# Patient Record
Sex: FEMALE | Race: WHITE | URBAN - METROPOLITAN AREA
[De-identification: names, ages, dates, MRNs, and addresses within clinical notes are randomized per-mention and may not be internally consistent; named-entity substitution may affect disease eponyms.]

---

## 2018-01-16 ENCOUNTER — HOSPITAL ENCOUNTER (INPATIENT)
Facility: CLINIC | Age: 83
LOS: 2 days | Discharge: HOME | DRG: 392 | End: 2018-01-18
Attending: INTERNAL MEDICINE | Admitting: INTERNAL MEDICINE
Payer: COMMERCIAL

## 2018-01-16 DIAGNOSIS — R53.81 PHYSICAL DECONDITIONING: Primary | ICD-10-CM

## 2018-01-16 PROBLEM — E87.0 HYPERNATREMIA: Status: ACTIVE | Noted: 2018-01-16

## 2018-01-16 PROBLEM — E87.6 HYPOKALEMIA: Status: ACTIVE | Noted: 2018-01-16

## 2018-01-16 PROBLEM — E87.1 HYPONATREMIA: Status: ACTIVE | Noted: 2018-01-16

## 2018-01-16 LAB
ANION GAP SERPL CALCULATED.3IONS-SCNC: 12 MMOL/L (ref 3–14)
BUN SERPL-MCNC: 51 MG/DL (ref 7–30)
CALCIUM SERPL-MCNC: 7.5 MG/DL (ref 8.5–10.1)
CHLORIDE SERPL-SCNC: 87 MMOL/L (ref 94–109)
CO2 SERPL-SCNC: 22 MMOL/L (ref 20–32)
CREAT SERPL-MCNC: 1.05 MG/DL (ref 0.52–1.04)
GFR SERPL CREATININE-BSD FRML MDRD: 49 ML/MIN/1.7M2
GLUCOSE SERPL-MCNC: 75 MG/DL (ref 70–99)
MAGNESIUM SERPL-MCNC: 2.9 MG/DL (ref 1.6–2.3)
POTASSIUM SERPL-SCNC: 3 MMOL/L (ref 3.4–5.3)
SODIUM SERPL-SCNC: 121 MMOL/L (ref 133–144)

## 2018-01-16 PROCEDURE — 25000128 H RX IP 250 OP 636: Performed by: INTERNAL MEDICINE

## 2018-01-16 PROCEDURE — 12000000 ZZH R&B MED SURG/OB

## 2018-01-16 PROCEDURE — 99207 ZZC CDG-MDM COMPONENT: MEETS MODERATE - UP CODED: CPT | Performed by: INTERNAL MEDICINE

## 2018-01-16 PROCEDURE — 99223 1ST HOSP IP/OBS HIGH 75: CPT | Mod: AI | Performed by: INTERNAL MEDICINE

## 2018-01-16 PROCEDURE — 83735 ASSAY OF MAGNESIUM: CPT | Performed by: INTERNAL MEDICINE

## 2018-01-16 PROCEDURE — 25000132 ZZH RX MED GY IP 250 OP 250 PS 637: Performed by: INTERNAL MEDICINE

## 2018-01-16 PROCEDURE — 36415 COLL VENOUS BLD VENIPUNCTURE: CPT | Performed by: INTERNAL MEDICINE

## 2018-01-16 PROCEDURE — 80048 BASIC METABOLIC PNL TOTAL CA: CPT | Performed by: INTERNAL MEDICINE

## 2018-01-16 RX ORDER — ONDANSETRON 2 MG/ML
4 INJECTION INTRAMUSCULAR; INTRAVENOUS EVERY 6 HOURS PRN
Status: DISCONTINUED | OUTPATIENT
Start: 2018-01-16 | End: 2018-01-18 | Stop reason: HOSPADM

## 2018-01-16 RX ORDER — SODIUM CHLORIDE AND POTASSIUM CHLORIDE 150; 900 MG/100ML; MG/100ML
INJECTION, SOLUTION INTRAVENOUS CONTINUOUS
Status: DISPENSED | OUTPATIENT
Start: 2018-01-16 | End: 2018-01-18

## 2018-01-16 RX ORDER — AMOXICILLIN 250 MG
2 CAPSULE ORAL 2 TIMES DAILY PRN
Status: DISCONTINUED | OUTPATIENT
Start: 2018-01-16 | End: 2018-01-18 | Stop reason: HOSPADM

## 2018-01-16 RX ORDER — POTASSIUM CHLORIDE 1500 MG/1
20-40 TABLET, EXTENDED RELEASE ORAL
Status: DISCONTINUED | OUTPATIENT
Start: 2018-01-16 | End: 2018-01-18 | Stop reason: HOSPADM

## 2018-01-16 RX ORDER — PROCHLORPERAZINE MALEATE 5 MG
5 TABLET ORAL EVERY 6 HOURS PRN
Status: DISCONTINUED | OUTPATIENT
Start: 2018-01-16 | End: 2018-01-18 | Stop reason: HOSPADM

## 2018-01-16 RX ORDER — ACETAMINOPHEN 325 MG/1
650 TABLET ORAL EVERY 4 HOURS PRN
Status: DISCONTINUED | OUTPATIENT
Start: 2018-01-16 | End: 2018-01-18 | Stop reason: HOSPADM

## 2018-01-16 RX ORDER — POTASSIUM CHLORIDE 7.45 MG/ML
10 INJECTION INTRAVENOUS
Status: DISCONTINUED | OUTPATIENT
Start: 2018-01-16 | End: 2018-01-18 | Stop reason: HOSPADM

## 2018-01-16 RX ORDER — ONDANSETRON 4 MG/1
4 TABLET, ORALLY DISINTEGRATING ORAL EVERY 6 HOURS PRN
Status: DISCONTINUED | OUTPATIENT
Start: 2018-01-16 | End: 2018-01-18 | Stop reason: HOSPADM

## 2018-01-16 RX ORDER — POTASSIUM CHLORIDE 29.8 MG/ML
20 INJECTION INTRAVENOUS
Status: DISCONTINUED | OUTPATIENT
Start: 2018-01-16 | End: 2018-01-18 | Stop reason: HOSPADM

## 2018-01-16 RX ORDER — POTASSIUM CL/LIDO/0.9 % NACL 10MEQ/0.1L
10 INTRAVENOUS SOLUTION, PIGGYBACK (ML) INTRAVENOUS
Status: DISCONTINUED | OUTPATIENT
Start: 2018-01-16 | End: 2018-01-18 | Stop reason: HOSPADM

## 2018-01-16 RX ORDER — AMOXICILLIN 250 MG
1 CAPSULE ORAL 2 TIMES DAILY PRN
Status: DISCONTINUED | OUTPATIENT
Start: 2018-01-16 | End: 2018-01-18 | Stop reason: HOSPADM

## 2018-01-16 RX ORDER — POTASSIUM CHLORIDE 1.5 G/1.58G
20-40 POWDER, FOR SOLUTION ORAL
Status: DISCONTINUED | OUTPATIENT
Start: 2018-01-16 | End: 2018-01-18 | Stop reason: HOSPADM

## 2018-01-16 RX ORDER — PROCHLORPERAZINE 25 MG
12.5 SUPPOSITORY, RECTAL RECTAL EVERY 12 HOURS PRN
Status: DISCONTINUED | OUTPATIENT
Start: 2018-01-16 | End: 2018-01-18 | Stop reason: HOSPADM

## 2018-01-16 RX ORDER — HYDROCODONE BITARTRATE AND ACETAMINOPHEN 5; 325 MG/1; MG/1
1 TABLET ORAL EVERY 6 HOURS PRN
Status: DISCONTINUED | OUTPATIENT
Start: 2018-01-16 | End: 2018-01-16

## 2018-01-16 RX ORDER — MAGNESIUM SULFATE HEPTAHYDRATE 40 MG/ML
4 INJECTION, SOLUTION INTRAVENOUS EVERY 4 HOURS PRN
Status: DISCONTINUED | OUTPATIENT
Start: 2018-01-16 | End: 2018-01-18 | Stop reason: HOSPADM

## 2018-01-16 RX ORDER — BISACODYL 10 MG
10 SUPPOSITORY, RECTAL RECTAL DAILY PRN
Status: DISCONTINUED | OUTPATIENT
Start: 2018-01-16 | End: 2018-01-18 | Stop reason: HOSPADM

## 2018-01-16 RX ORDER — NALOXONE HYDROCHLORIDE 0.4 MG/ML
.1-.4 INJECTION, SOLUTION INTRAMUSCULAR; INTRAVENOUS; SUBCUTANEOUS
Status: DISCONTINUED | OUTPATIENT
Start: 2018-01-16 | End: 2018-01-18 | Stop reason: HOSPADM

## 2018-01-16 RX ADMIN — POTASSIUM CHLORIDE 40 MEQ: 1.5 POWDER, FOR SOLUTION ORAL at 22:39

## 2018-01-16 RX ADMIN — POTASSIUM CHLORIDE AND SODIUM CHLORIDE: 900; 150 INJECTION, SOLUTION INTRAVENOUS at 22:22

## 2018-01-16 NOTE — IP AVS SNAPSHOT
[place] [address] [facility]    [address]    [facility] [address]    Phone:  [phone]    Fax:  [phone]                                       After Visit Summary   1/16/2018    Dorothy E Severson    MRN: [id number]           After Visit Summary Signature Page     I have received my discharge instructions, and my questions have been answered. I have discussed any challenges I see with this plan with the nurse or doctor.    ..........................................................................................................................................  Patient/Patient Representative Signature      ..........................................................................................................................................  Patient Representative Print Name and Relationship to Patient    ..................................................               ................................................  Date                                            Time    ..........................................................................................................................................  Reviewed by Signature/Title    ...................................................              ..............................................  Date                                                            Time

## 2018-01-16 NOTE — IP AVS SNAPSHOT
MRN:[id number]                      After Visit Summary   1/16/2018    Dorothy E Severson    MRN: [id number]           Thank you!     Thank you for choosing [facility] for your care. Our goal is always to provide you with excellent care. Hearing back from our patients is one way we can continue to improve our services. Please take a few minutes to complete the written survey that you may receive in the mail after you visit. If you would like to speak to someone directly about your visit please contact Patient Relations at [phone]. Thank you!          Patient Information     Date Of Birth          12/7/1928        Designated Caregiver       Most Recent Value    Caregiver    Will someone help with your care after discharge? yes    Name of designated caregiver Paul Severson    Phone number of caregiver [phone]    Caregiver address [address] [place], [place] [id number]      About your hospital stay     You were admitted on:  January 16, 2018 You last received care in the:  [place] [address] [facility]    You were discharged on:  January 18, 2018       Who to Call     For medical emergencies, please call 911.  For non-urgent questions about your medical care, please call your primary care provider or clinic, [phone]          Attending Provider     Provider Specialty    Dominic Good MD Internal Medicine       Primary Care Provider Office Phone # Fax #    [facility] [phone]      After Care Instructions     Activity       Your activity upon discharge: activity as tolerated            Diet       Follow this diet upon discharge: regular                  Follow-up Appointments     Follow-up and recommended labs and tests        See primary MD as needed                  Pending Results     No orders found from 1/14/2018 to 1/17/2018.            Statement of Approval     Ordered          01/18/18 1358  I have reviewed and agree with all the  "recommendations and orders detailed in this document.  EFFECTIVE NOW     Approved and electronically signed by:  Marco Bolaños MD             Admission Information     Date & Time Provider Department Dept. Phone    2018 Dominic Good MD [place] [address] [facility] [phone]      Your Vitals Were     Blood Pressure Pulse Temperature Respirations Height Weight    134/53 (BP Location: Left arm) 64 97.1  F (36.2  C) (Oral) 18 1.499 m (4' 11.02\") 36.3 kg (80 lb)    Pulse Oximetry BMI (Body Mass Index)                99% 16.15 kg/m2          MyChart Information     [facility] lets you send messages to your doctor, view your test results, renew your prescriptions, schedule appointments and more. To sign up, go to [email or web address] . Click on \"Log in\" on the left side of the screen, which will take you to the Welcome page. Then click on \"Sign up Now\" on the right side of the page.     You will be asked to enter the access code listed below, as well as some personal information. Please follow the directions to create your username and password.     Your access code is: [email or web address]  Expires: 2018  2:25 PM     Your access code will  in 90 days. If you need help or a new code, please call your [facility] clinic or [phone].        Care EveryWhere ID     This is your Care EveryWhere ID. This could be used by other organizations to access your [facility] medical records  [id number]        Equal Access to Services     [facility] AH: Hadii aad ku hadasho Soomaali, waaxda luqadaha, qaybta kaalmada adeegyada, jacinto hale . So [facility] [phone].    ATENCIÓN: Si habla español, tiene a van disposición servicios gratuitos de asistencia lingüística. Llame al [phone].    We comply with applicable federal civil rights laws and [place] laws. We do not discriminate on the basis of race, color, national origin, age, disability, sex, sexual orientation, or gender " identity.               Review of your medicines      START taking        Dose / Directions    order for DME   Used for:  Physical deconditioning        Equipment being ordered: Walker Wheels () and Youth Walker () Treatment Diagnosis: Impaired gait stability   Quantity:  1 each   Refills:  0            Where to get your medicines      Some of these will need a paper prescription and others can be bought over the counter. Ask your nurse if you have questions.     Bring a paper prescription for each of these medications     order for DME                Protect others around you: Learn how to safely use, store and throw away your medicines at www.disposemymeds.org.             Medication List: This is a list of all your medications and when to take them. Check marks below indicate your daily home schedule. Keep this list as a reference.      Medications           Morning Afternoon Evening Bedtime As Needed    order for DME   Equipment being ordered: Walker Wheels () and Youth Walker () Treatment Diagnosis: Impaired gait stability

## 2018-01-17 ENCOUNTER — APPOINTMENT (OUTPATIENT)
Dept: PHYSICAL THERAPY | Facility: CLINIC | Age: 83
DRG: 392 | End: 2018-01-17
Attending: INTERNAL MEDICINE
Payer: COMMERCIAL

## 2018-01-17 LAB
ANION GAP SERPL CALCULATED.3IONS-SCNC: 7 MMOL/L (ref 3–14)
BUN SERPL-MCNC: 46 MG/DL (ref 7–30)
C DIFF TOX B STL QL: NEGATIVE
CALCIUM SERPL-MCNC: 7.5 MG/DL (ref 8.5–10.1)
CHLORIDE SERPL-SCNC: 99 MMOL/L (ref 94–109)
CO2 SERPL-SCNC: 20 MMOL/L (ref 20–32)
CREAT SERPL-MCNC: 0.96 MG/DL (ref 0.52–1.04)
GFR SERPL CREATININE-BSD FRML MDRD: 54 ML/MIN/1.7M2
GLUCOSE SERPL-MCNC: 70 MG/DL (ref 70–99)
POTASSIUM SERPL-SCNC: 4 MMOL/L (ref 3.4–5.3)
POTASSIUM SERPL-SCNC: 4 MMOL/L (ref 3.4–5.3)
SODIUM SERPL-SCNC: 126 MMOL/L (ref 133–144)
SPECIMEN SOURCE: NORMAL
TSH SERPL DL<=0.005 MIU/L-ACNC: 0.68 MU/L (ref 0.4–4)

## 2018-01-17 PROCEDURE — 99232 SBSQ HOSP IP/OBS MODERATE 35: CPT | Performed by: INTERNAL MEDICINE

## 2018-01-17 PROCEDURE — 40000914 ZZH STATISTIC SITTER, DAY HOURS

## 2018-01-17 PROCEDURE — 84443 ASSAY THYROID STIM HORMONE: CPT | Performed by: INTERNAL MEDICINE

## 2018-01-17 PROCEDURE — 36415 COLL VENOUS BLD VENIPUNCTURE: CPT | Performed by: INTERNAL MEDICINE

## 2018-01-17 PROCEDURE — 87493 C DIFF AMPLIFIED PROBE: CPT | Performed by: INTERNAL MEDICINE

## 2018-01-17 PROCEDURE — 12000007 ZZH R&B INTERMEDIATE

## 2018-01-17 PROCEDURE — 99207 ZZC CDG-MDM COMPONENT: MEETS LOW - DOWN CODED: CPT | Performed by: INTERNAL MEDICINE

## 2018-01-17 PROCEDURE — 40000916 ZZH STATISTIC SITTER, NIGHT HOURS

## 2018-01-17 PROCEDURE — 25000132 ZZH RX MED GY IP 250 OP 250 PS 637: Performed by: INTERNAL MEDICINE

## 2018-01-17 PROCEDURE — 97161 PT EVAL LOW COMPLEX 20 MIN: CPT | Mod: GP | Performed by: PHYSICAL THERAPIST

## 2018-01-17 PROCEDURE — 40000915 ZZH STATISTIC SITTER, EVENING HOURS

## 2018-01-17 PROCEDURE — 80048 BASIC METABOLIC PNL TOTAL CA: CPT | Performed by: INTERNAL MEDICINE

## 2018-01-17 PROCEDURE — 84132 ASSAY OF SERUM POTASSIUM: CPT | Performed by: INTERNAL MEDICINE

## 2018-01-17 PROCEDURE — 25000128 H RX IP 250 OP 636: Performed by: INTERNAL MEDICINE

## 2018-01-17 PROCEDURE — 97530 THERAPEUTIC ACTIVITIES: CPT | Mod: GP | Performed by: PHYSICAL THERAPIST

## 2018-01-17 PROCEDURE — 97116 GAIT TRAINING THERAPY: CPT | Mod: GP | Performed by: PHYSICAL THERAPIST

## 2018-01-17 PROCEDURE — 40000193 ZZH STATISTIC PT WARD VISIT: Performed by: PHYSICAL THERAPIST

## 2018-01-17 RX ORDER — MULTIPLE VITAMINS W/ MINERALS TAB 9MG-400MCG
1 TAB ORAL DAILY
Status: DISCONTINUED | OUTPATIENT
Start: 2018-01-18 | End: 2018-01-18 | Stop reason: HOSPADM

## 2018-01-17 RX ADMIN — ACETAMINOPHEN 650 MG: 325 TABLET, FILM COATED ORAL at 20:01

## 2018-01-17 RX ADMIN — POTASSIUM CHLORIDE AND SODIUM CHLORIDE: 900; 150 INJECTION, SOLUTION INTRAVENOUS at 20:31

## 2018-01-17 RX ADMIN — POTASSIUM CHLORIDE 20 MEQ: 1.5 POWDER, FOR SOLUTION ORAL at 00:29

## 2018-01-17 ASSESSMENT — ACTIVITIES OF DAILY LIVING (ADL)
ADLS_ACUITY_SCORE: 16

## 2018-01-17 NOTE — PLAN OF CARE
Problem: Patient Care Overview  Goal: Plan of Care/Patient Progress Review  Orientation: A/O to self only, triggered bed alarm/VPM continuously at start of shift and sitter initiated with effectiveness. Continues to attempt to exit bed and repeated questions and obvious confusion.   VS stable, afebrile, denies pain   Tele: SR - 60s   LS: dim  GI: + Flatus + BM (incontinent-large). Denies N/V. BS active  : Adequate urine output-frequency noted and continent this shift  Diet: Clear Liquid  PIV: NS and 20mEq of K+ decreased this shift to 40ml/hr. Potassium replaced erlin/noc with recheck 4.0  Activity: Assist of 1 with gait. Pt slept comfortably throughout shift.   Disposition: Expected discharge TBD

## 2018-01-17 NOTE — PROGRESS NOTES
Infection Prevention:    Patient placed on Enteric precautions because of diarrhea with possible infectious etiology. Please contact Infection Prevention with any questions/concerns at *[phone].    Kellie Baez, ICP

## 2018-01-17 NOTE — PLAN OF CARE
Problem: Patient Care Overview  Goal: Plan of Care/Patient Progress Review  VSS. Denied pain. Tele: SR. Enteric D/C-Cdiff negative. Oriented to self only. Up w/ SBA, walked in halls. Continent of urine. Tolerating regular diet. Coccyx red,blanchable, new mepilex applied. Skin tear to L arm TALAT,Scabbed. Son in Law w/ patient second half of shift, sitter currently discontinued. 2 loose bowel movements. K:4.0, .

## 2018-01-17 NOTE — PHARMACY-ADMISSION MEDICATION HISTORY
Attempted med rec with family.  Talked with daughter in [place]- she said son Chilango lives with her would be the one to know what meds was getting.  She also said she thought it was mostly supplements.    I did get a hold of Chilango.  He said she is only on vitamin /supplements (had some older entries in epic for lisinopril and other meds).  I clarified specifically that she was not on those prescription meds.  When I asked for details on supplements taking he said since they weren't probably going to be given in the hospital he didn't see why we needed to list them out.  He had talked with [facility] last evening and discussed these same meds.   There is a list of supplements in the Care Everywhere record that I think may be that information.    Paged MD to let him know I was taking everything off our list but there were some supplements and if he thought she needed something he could order.

## 2018-01-17 NOTE — CONSULTS
"CLINICAL NUTRITION SERVICES  -  ASSESSMENT NOTE      Malnutrition: Non-severe or greater        REASON FOR ASSESSMENT  Dorothy E Severson is a 89 year old female seen by Registered Dietitian for Admission Nutrition Risk Screen - Unintentional weight loss of 10# or more in past 2 months and Nutrition Jose <3.      NUTRITION HISTORY  - Information obtained from patient's son in room.  Limited as patient resides with a different son and family reports she has minimal nutrition insight.  - Patient with a h/o dementia.  - Overall acute illness with N/V and diarrhea x 6 days PTA.  Admitted with likely viral gastroenteritis.    - Resides with son (Chilango) who prepares all meals and does all grocery shopping.  - Son currently in room does confirm a decrease in overall appetite during the past >1 year and feels his mom has had a decline in the amount of food she eats.  Denies long-term N/V or diarrhea or chewing/swallowing difficulty.  He does not believe she takes any oral supplements at home.  He confirms that she has had weight loss during this time (see below).   - NKFA.      CURRENT NUTRITION ORDERS  Diet Order:     Regular    Current Intake/Tolerance:  Eating small amounts of hamburger while in room.        PHYSICAL FINDINGS  Observed  With overt fat/muscle loss, see below, did not observe LEs  Obtained from Chart/Interdisciplinary Team  Confused/oriented to self  BM 1/17    ANTHROPOMETRICS  Height: 4' 11\"  Weight: 36.4 kg (80#)  Body mass index is 16.15 kg/(m^2).  Weight Status:  Underweight BMI <18.5  IBW: 44.5 kg (98#)  % IBW: 82%  Weight History:  Wt Readings from Last 10 Encounters:   01/16/18 36.3 kg (80 lb)   01/03/13 47.6 kg (105 lb)   08/28/12 49.9 kg (110 lb)   08/21/12 49.4 kg (109 lb)   07/16/12 50.3 kg (111 lb)   - Minimal recent wt trending available.  Per care everywhere office visit 8/24/2016 = 101#.  This indicates that wt has trended down over the past >1 year (by at least 21%), though the timeframe " is unclear at this time.  Son in room confirms wt loss but not sure of exact wt lost or timeframe.    LABS  Labs reviewed    MEDICATIONS  Medications reviewed    Dosing Weight 36.4 kg - admit wt    ASSESSED NUTRITION NEEDS PER APPROVED PRACTICE GUIDELINES:  Estimated Energy Needs: 4342-4330+ kcals (30-35 Kcal/Kg+)  Justification: repletion  Estimated Protein Needs: 44-55+ grams protein (1.2-1.5 g pro/Kg+)  Justification: Repletion  Estimated Fluid Needs: 1399-3851 mL (1 mL/Kcal)  Justification: maintenance and per provider pending fluid status    MALNUTRITION:  % Weight Loss:  Unable to determine  % Intake:  <75% for >/= 3 months (non-severe malnutrition)  Subcutaneous Fat Loss:  Orbital region mild depletion and Upper arm region mild depletion  Muscle Loss:  Temporal region moderate depletion, Acromion bone region moderatge depletion and Dorsal hand region moderate depletion  Fluid Retention:  None noted    Malnutrition Diagnosis: Non-Severe malnutrition (or greater)  In Context of:  Chronic illness or disease on  Environmental or social circumstances    NUTRITION DIAGNOSIS:  Inadequate oral intake related to decreased appetite, acute illness, and likely progressive/chronic disease (dementia) as evidenced by likely meeting <75% needs x >3 months, 21% wt loss with unclear timeframe, at least mild to moderate fat/muscle loss with coding of non-severe malnutrition or greater.        NUTRITION INTERVENTIONS  Recommendations / Nutrition Prescription  Change to high kcal/high protein diet.    Ensure shake BID between meals.  Son agreeable.      Daily MVI/M.      Implementation  Nutrition education: Provided education on addition of supplement with son in room.  Discussed offerings of meals TID with use of supplement between meals as able.    Medical Food Supplement, Multivitamin/Mineral: As above.    Collaboration and Referral of Nutrition care: Discussed POC with team during rounds.      Nutrition Goals  Patient to  consume at least 50% of meals TID + 1-2 supplements daily.       MONITORING AND EVALUATION:  Progress towards goals will be monitored and evaluated per protocol and Practice Guidelines        Brooke Jewell RD, LD  Clinical Dietitian  3rd floor/ICU: [phone]  All other floors: [phone]  Weekend/holiday: [phone]

## 2018-01-17 NOTE — H&P
[facility]  History and Physical   Hospitalist Service    Dominic Good MD    Dorothy E Severson MRN# [id number]   YOB: 1928 Age: 89 year old      Date of Admission:  1/16/2018           Assessment and Plan:   Dorothy E. Severson is an 89-year-old female with history of hypertension, GERD, sciatica, dementia, osteoarthritis, osteoporosis, cataract surgery, and rotator cuff surgery.  She presented to [place] [facility] emergency department for evaluation of weakness, nausea, vomiting, diarrhea, dizziness, and falls. She had felt ill for 6 days. Her son with whom she lives has also been ill for 3 or 4 days.  She had fallen 2 days prior but not on the day of presentation.  She sustained no injury.  Her main complaint seemed to be ongoing diarrhea.  Nausea and vomiting had stopped a day or so before. Vital signs showed elevated blood pressure but they were otherwise stable.  Labs showed hyponatremia with sodium 116, hypokalemia with potassium of 2.7, metabolic acidosis with bicarb of 17, and acute kidney insufficiency with BUN of 52 and creatinine of 1.23.  CBC was unremarkable.  Urinalysis, liver function tests, and influenza testing were negative.  She was having some back pain so lumbar spine x-ray was obtained and showed no acute process.  There were no beds at [facility] so I was asked to admit Shantell directly with hyponatremia and hyperkalemia in the setting of likely viral gastroenteritis.    Problem list:    1.  Likely viral gastroenteritis.  Supportive cares.  Clear liquid diet for now.    2.  Hyponatremia.  Likely due to GI sodium losses from vomiting and diarrhea with free water replacement.  I suspect that this is acute.  Continue normal saline but at 40 cc/h she is already corrected from 116 to 121.  Clear liquid diet will be ordered for now.    3.  Hypokalemia.  Likely due to GI losses with free water replacement.  Replace per protocol.    4.  Physical deconditioning.   Treat underlying issues discussed above.  Physical therapy will be consulted.    DNR/DNI.  Confirmed with the patient's family  Mechanical DVT prophylaxis  Disposition: Admit as inpatient           Code Status:   DNR / DNI         Primary Care Physician:   [facility] [phone]         Chief Complaint:   Diarrhea, hyponatremia    History is obtained from Shantell, ED provider at [facility]         History of Present Illness:   Dorothy E. Severson is an 89-year-old female with history of hypertension, GERD, sciatica, dementia, osteoarthritis, osteoporosis, cataract surgery, and rotator cuff surgery.  She presented to [facility] emergency department for evaluation of weakness, nausea, vomiting, diarrhea, dizziness, and falls. She had felt ill for 6 days. Her son with whom she lives has also been ill for 3 or 4 days.  She had fallen 2 days prior but not on the day of presentation.  She sustained no injury.  Her main complaint seemed to be ongoing diarrhea.  Nausea and vomiting had stopped a day or so before. Vital signs showed elevated blood pressure but they were otherwise stable.  Labs showed hyponatremia with sodium 116, hypokalemia with potassium of 2.7, metabolic acidosis with bicarb of 17, and acute kidney insufficiency with BUN of 52 and creatinine of 1.23.  CBC was unremarkable.  Urinalysis, liver function tests, and influenza testing were negative.  She was having some back pain so lumbar spine x-ray was obtained and showed no acute process.  There were no beds at [facility] so I was asked to admit Shantell directly with hyponatremia and hyperkalemia in the setting of likely viral gastroenteritis.           Past Medical History:     Patient Active Problem List   Diagnosis     Health Care Home     DDD (degenerative disc disease), lumbar     Hypernatremia     Hypokalemia     Hyponatremia      Past Medical History:   Diagnosis Date     Gastro-oesophageal reflux disease      Hypertension        "       Past Surgical History:     Past Surgical History:   Procedure Laterality Date     COLONOSCOPY  7/16/2012    Procedure: COLONOSCOPY;  COLONOSCOPY;  Surgeon: Eduin Pearson MD;  Location:  GI     EYE SURGERY      cataract     ORTHOPEDIC SURGERY      arthroscopy for rotatotr cuff tear            Home Medications:     Prior to Admission medications    Medication Sig Last Dose Taking? Auth Provider       Nicola Gautam MD Steinman, Randall Ira, MD       Trigger, Jayce Horton MD       Unknown, Entered By History   calcium carbonate (TUMS) 500 MG chewable tablet Take 1 chew tab by mouth daily.     Reported, Patient   Cholecalciferol (VITAMIN D3 PO) Take 2,000 Units by mouth daily.     Reported, Patient   garlic 150 MG TABS Take 150 mg by mouth daily.     Reported, Patient   Omega-3 Fatty Acids (OMEGA-3 FISH OIL PO) Take  by mouth.     Reported, Patient       Reported, Patient   Multiple Vitamins-Minerals (CENTRUM SILVER) per tablet Take 1 tablet by mouth daily.     Reported, Patient   Ascorbic Acid (VITAMIN C PO) Take 1,000 mg by mouth.     Reported, Patient   VITAMIN E NATURAL PO Take  by mouth.     Reported, Patient   GLUCOSAMINE SULFATE PO Take 1,500 mg by mouth.     Reported, Patient   glucosamine-chondroitin 500-400 MG CAPS Take 1 capsule by mouth daily.     Reported, Patient            Allergies:   No Known Allergies         Social History:     Social History   Substance Use Topics     Smoking status: Never Smoker     Smokeless tobacco: Not on file     Alcohol use No             Family History:   Coronary artery disease and diabetes           Review of Systems:   The 10 point Review of Systems is negative other than as noted in the HPI.           Physical Exam:   Blood pressure 157/54, temperature 96.6  F (35.9  C), temperature source Oral, resp. rate 12, height 1.499 m (4' 11.02\"), weight 36.3 kg (80 lb), SpO2 100 %.  80 lbs 0 oz      GENERAL: Pleasant and cooperative. No acute " distress.  EYES: Pupils equal and round. No scleral erythema or icterus.  ENT: External ears are normal without deformity. Posterior oropharynx is without erythem, swelling, or exudate.  NECK: Supple. No masses or swelling. No tenderness. Thyroid is normal without mass or tenderness.  CHEST: Clear to auscultation. Normal breath sounds. No retractions.   CV: Regular rate and rhythm. No JVD. Pulses normal.  ABDOMEN: Bowel sounds present. No tenderness. No masses or hernia.  EXTREMETIES: No clubbing, cyanosis, or ischemia.  SKIN: Warm and dry to touch. No wounds or rashes.  NEUROLOGIC: Strength and sensation are normal. Deep tendon reflexes are normal. Cranial nerves are normal.             Data:   All new lab and imaging data was reviewed.     Results for orders placed or performed during the hospital encounter of 01/16/18 (from the past 24 hour(s))   Basic metabolic panel   Result Value Ref Range    Sodium 121 (L) 133 - 144 mmol/L    Potassium 3.0 (L) 3.4 - 5.3 mmol/L    Chloride 87 (L) 94 - 109 mmol/L    Carbon Dioxide 22 20 - 32 mmol/L    Anion Gap 12 3 - 14 mmol/L    Glucose 75 70 - 99 mg/dL    Urea Nitrogen 51 (H) 7 - 30 mg/dL    Creatinine 1.05 (H) 0.52 - 1.04 mg/dL    GFR Estimate 49 (L) >60 mL/min/1.7m2    GFR Estimate If Black 60 (L) >60 mL/min/1.7m2    Calcium 7.5 (L) 8.5 - 10.1 mg/dL   Magnesium   Result Value Ref Range    Magnesium 2.9 (H) 1.6 - 2.3 mg/dL

## 2018-01-17 NOTE — CONSULTS
Care Transition Initial Assessment - RN  Reason For Consult: care coordination/care conference, discharge planning  Met with: Patient and Son Feliberto    Active Problems:    Hypernatremia    Hypokalemia    Hyponatremia         DATA  Lives With: child(alexsander), adult Chilango who is caregiver  Living Arrangements: house     Who is your support system?: Children.  Chilango Caregiver and he lives with herFeliberto supportive and involved with care, dtg Claribel supportive, lives in [place]    Identified issues/concerns regarding health management: Pt is admitted with weakness and fall.  Pt did work with PT and is improving.  Son, Feliberto is hopeful that she can return home with home care support.  He will talk with Chilango who is the caregiver to see what he thinks also.  He is getting over being ill.  She does have stairs in the home she must do.  She is active and he is hopeful she will continue to improve.  Pt may benefit from doing stairs tomorrow to make sure she can do them safely.  Son was there when she worked with PT today.  Pt did use a walker and did well with it.  She doesn't have one at home, but they are interested in one.           Transportation Available: family or friend will provide    ASSESSMENT  Cognitive Status:  awake, alert and intermittent confusion  Concerns to be addressed: Feliberto and Chilango will talk this afternoon and finalize dc plan.  Feliberto thinks home care PT support would be beneficial.  He will ask Chilango if he has a preference for agency and inform me in the morning of preference.   .       PLAN  Patient given options and choices for discharge YES .  Patient/family is agreeable to the plan?  Yes:   Patient Goals and Preferences: yes .  Patient anticipates discharging to:  Home with son and home care PT support .    Xiomara MELGAR CTS [id number]

## 2018-01-17 NOTE — PROGRESS NOTES
[facility]  Hospitalist Progress Note  Marco Bolaños MD 01/17/2018    Reason for Stay (Diagnosis): gastroenteritis         Assessment and Plan:      Summary of Stay: Dorothy E Severson is a 89-year-old female with history of hypertension, GERD, sciatica, dementia, osteoarthritis, osteoporosis, cataract surgery, and rotator cuff surgery.  She presented to [place] emergency department for evaluation of weakness, nausea, vomiting, diarrhea, dizziness, and falls. She had felt ill for 6 days. Her son with whom she lives has also been ill for 3 or 4 days.  She had fallen 2 days prior but not on the day of presentation.  She sustained no injury.  Her main complaint seemed to be ongoing diarrhea.  Nausea and vomiting had stopped a day or so before. Vital signs showed elevated blood pressure but they were otherwise stable.  Labs showed hyponatremia with sodium 116, hypokalemia with potassium of 2.7, metabolic acidosis with bicarb of 17, and acute kidney insufficiency with BUN of 52 and creatinine of 1.23.  CBC was unremarkable.  Urinalysis, liver function tests, and influenza testing were negative.  She was having some back pain so lumbar spine x-ray was obtained and showed no acute process.  There were no beds at [facility] so I was asked to admit Shantell directly with hyponatremia and hyperkalemia in the setting of likely viral gastroenteritis.     Problem list:     1.  Likely viral gastroenteritis.  Supportive cares.  advance diet to regular.  cdiff neg     2.  Hyponatremia.  Likely due to GI sodium losses from vomiting and diarrhea with free water replacement.  I suspect that this is acute.  Continue normal saline.  BMP in AM     3.  Hypokalemia.  Likely due to GI losses with free water replacement.  Replace per protocol.     4.  Physical deconditioning.  Treat underlying issues discussed above.  Physical therapy will be consulted.    5.  dementia     DNR/DNI.  Confirmed with the  "patient's family  Mechanical DVT prophylaxis  Disposition: Admit as inpatient.  Possible d/c tomorrow if sx improved        Interval History (Subjective):      Limited by dementia; has no complaints to me this AM                  Physical Exam:      Last Vital Signs:  /51 (BP Location: Left arm)  Pulse 64  Temp 96.6  F (35.9  C) (Oral)  Resp 16  Ht 1.499 m (4' 11.02\")  Wt 36.3 kg (80 lb)  SpO2 100%  BMI 16.15 kg/m2      Intake/Output Summary (Last 24 hours) at 01/17/18 1320  Last data filed at 01/17/18 0640   Gross per 24 hour   Intake              963 ml   Output             1350 ml   Net             -387 ml       Constitutional: Awake, alert, cooperative, no apparent distress   Respiratory: Clear to auscultation bilaterally, no crackles or wheezing   Cardiovascular: Regular rate and rhythm, normal S1 and S2, and no murmur noted   Abdomen: Normal bowel sounds, soft, non-distended, non-tender   Skin: No rashes, no cyanosis, dry to touch   Neuro: Alert and confused c/w dementia, no weakness, numbness, + memory loss   Extremities: No edema, normal range of motion   Other(s):        All other systems: Negative          Medications:      All current medications were reviewed with changes reflected in problem list.         Data:      All new lab and imaging data was reviewed.   Labs:  No results for input(s): WBC, HGB, HCT, MCV, PLT in the last 168 hours.   Imaging:   No results found for this or any previous visit (from the past 24 hour(s)).   "

## 2018-01-17 NOTE — PLAN OF CARE
Problem: Patient Care Overview  Goal: Plan of Care/Patient Progress Review  PT: Pt admitted w/ weakness and falls, gastroenteritis dx. Lives w/ son in split-level home. Son is primary caregiver, assists w/ ADLs as needed at baseline. Pt ambulates independently at baseline, but has been falling lately. Hx of dementia.    Discharge Planner PT   Patient plan for discharge: Home w/ son, pending progress   Current status: CGA w/ all functional mobility. Unsteady gait at times, improving w/ FWW. Pt at risk for falls. Deconditioning and weakness noted. Son states cognition is below baseline. Amb x 150 w/ FWW and CGA. Recommend pt ambulate w/ nursing staff in hallways 3x/day.  Barriers to return to prior living situation: Dementia, fall risk, stairs  Recommendations for discharge: Home w/ 24/7 support if able w/ home PT vs TCU pending progress  Rationale for recommendations: Will benefit from ongoing skilled PT intervention, currently needing 24/7 support to ensure safety. Anticipate improvement w/ medical management.       Entered by: Dorothea Lara 01/17/2018 4:10 PM

## 2018-01-17 NOTE — PROGRESS NOTES
01/17/18 1340   Quick Adds   Type of Visit Initial PT Evaluation   Living Environment   Lives With child(alexsander), adult   Living Arrangements house   Home Accessibility stairs to enter home;stairs within home   Number of Stairs to Enter Home 3   Number of Stairs Within Home 8   Stair Railings at Home inside, present on right side   Transportation Available family or friend will provide   Living Environment Comment Lives with son Chilango in [place] in a split-level house. 3 steps to enter w/o railing and ~7 steps from entry to main level where pt's bedroom and bath are.    Self-Care   Dominant Hand left   Usual Activity Tolerance good   Current Activity Tolerance moderate   Regular Exercise no   Equipment Currently Used at Home none   Activity/Exercise/Self-Care Comment Rigo Dao reports pt goes on 2 mile walks in summer with other son Chilango as well as stretching class. Does not use AD at baseline for gait, rigo Kee provides PRN assistance w/ ADLs.   Functional Level Prior   Ambulation 0-->independent   Transferring 0-->independent   Toileting 1-->assistive equipment   Bathing 1-->assistive equipment   Dressing 1-->assistive equipment   Eating 0-->independent   Communication 0-->understands/communicates without difficulty   Swallowing 0-->swallows foods/liquids without difficulty   Cognition 1 - attention or memory deficits   Fall history within last six months yes   Number of times patient has fallen within last six months 2   Which of the above functional risks had a recent onset or change? ambulation;transferring;toileting;bathing;dressing;cognition   Prior Functional Level Comment Rigo Dao reports pt was living with other son who provided care throughout day; reports patient was independent PLOF.    General Information   Onset of Illness/Injury or Date of Surgery - Date 01/16/18   Referring Physician Dominic Good   Patient/Family Goals Statement Home w/ assist of son, but seem to be open to possibility of TCU    Pertinent History of Current Problem (include personal factors and/or comorbidities that impact the POC) Pt admitted to ER 1/16/18 w/ complaints of diahrrhea following feeling ill for 6 days prior w/ nausea, vomitting. Lives with son in [place] in split-level home w/ 3 steps to enter. Independent PLOF w/ help from son who stays with her throughout the day. Has dementia, A/O x 1 (to self)); dre Dao reports that current cognition is worse than baseline. Hope to return to home with son but seem open to possibility of TCU.   Precautions/Limitations fall precautions   General Observations Dre Dao in room providing information   Cognitive Status Examination   Orientation person   Level of Consciousness confused   Follows Commands and Answers Questions 50% of the time   Personal Safety and Judgment impaired   Memory impaired   Cognitive Comment Dre Dao reports cognition is currently worse than baseline.   Pain Assessment   Patient Currently in Pain No   Integumentary/Edema   Integumentary/Edema Comments Intact   Posture    Posture Forward head position;Protracted shoulders   Range of Motion (ROM)   ROM Comment UE/LE WFL   Strength   Strength Comments UE/LE WFL, hip weakness noted   Transfer Skills   Transfer Comments Sit<>stand w/ min A due to posterior lean   Gait   Gait Comments Amb bedside w/ UE support for balance; step-through gait w/ occasional LOB, CGA x 1 for safety   Balance   Balance Comments Unsteady after standing and occasioanlly during gait and turns. Posterior lean w/ standing.   Sensory Examination   Sensory Perception Comments Pt reports no sensation deficits   General Therapy Interventions   Planned Therapy Interventions balance training;bed mobility training;gait training;neuromuscular re-education;stretching;strengthening;transfer training;risk factor education;home program guidelines;progressive activity/exercise   Clinical Impression   Criteria for Skilled Therapeutic Intervention yes,  "treatment indicated   PT Diagnosis Difficulty with functional mobility   Influenced by the following impairments Decreased strength, balance, and cognition s/p illness   Functional limitations due to impairments Impaired bed mobility, transfers, gait, stairs   Clinical Presentation Stable/Uncomplicated   Clinical Presentation Rationale Impaired cognition, generalized weakness, medical status generally improving, independent PLOR and lives with son   Clinical Decision Making (Complexity) Low complexity   Therapy Frequency` daily   Predicted Duration of Therapy Intervention (days/wks) 3-5 days   Anticipated Equipment Needs at Discharge front wheeled walker  (pending progress)   Anticipated Discharge Disposition Transitional Care Facility;Home with Assist;Home with Home Therapy  (pending progress)   Risk & Benefits of therapy have been explained Yes   Patient, Family & other staff in agreement with plan of care Yes   [facility] [employer] TM \"6 Clicks\"   2016, Trustees of [facility], under license to [employer].  All rights reserved.   6 Clicks Short Forms Basic Mobility Inpatient Short Form   [facility] AM-PAC  \"6 Clicks\" V.2 Basic Mobility Inpatient Short Form   1. Turning from your back to your side while in a flat bed without using bedrails? 3 - A Little   2. Moving from lying on your back to sitting on the side of a flat bed without using bedrails? 3 - A Little   3. Moving to and from a bed to a chair (including a wheelchair)? 3 - A Little   4. Standing up from a chair using your arms (e.g., wheelchair, or bedside chair)? 3 - A Little   5. To walk in hospital room? 3 - A Little   6. Climbing 3-5 steps with a railing? 3 - A Little   Basic Mobility Raw Score (Score out of 24.Lower scores equate to lower levels of function) 18   Total Evaluation Time   Total Evaluation Time (Minutes) 20     "

## 2018-01-17 NOTE — PLAN OF CARE
Problem: Patient Care Overview  Goal: Plan of Care/Patient Progress Review  Outcome: No Change  Pt up with one assist and gait belt. Gait unsteady at times. Pt continent of urine but family reports that she can be incontinent of both B&B. Pt only oriented to self. Skin tear noted to left arm, adaptic dressing with roll gauze applied. Blanchable redness noted to coccyx, mepilex dressing placed. K+ 3.0, oral replacement initiated, BMP scheduled for the morning.

## 2018-01-18 ENCOUNTER — APPOINTMENT (OUTPATIENT)
Dept: PHYSICAL THERAPY | Facility: CLINIC | Age: 83
DRG: 392 | End: 2018-01-18
Attending: INTERNAL MEDICINE
Payer: COMMERCIAL

## 2018-01-18 VITALS
BODY MASS INDEX: 16.13 KG/M2 | WEIGHT: 80 LBS | TEMPERATURE: 97.1 F | RESPIRATION RATE: 18 BRPM | HEIGHT: 59 IN | SYSTOLIC BLOOD PRESSURE: 134 MMHG | HEART RATE: 64 BPM | OXYGEN SATURATION: 99 % | DIASTOLIC BLOOD PRESSURE: 53 MMHG

## 2018-01-18 LAB
ANION GAP SERPL CALCULATED.3IONS-SCNC: 5 MMOL/L (ref 3–14)
BUN SERPL-MCNC: 30 MG/DL (ref 7–30)
CALCIUM SERPL-MCNC: 7.5 MG/DL (ref 8.5–10.1)
CHLORIDE SERPL-SCNC: 108 MMOL/L (ref 94–109)
CO2 SERPL-SCNC: 22 MMOL/L (ref 20–32)
CREAT SERPL-MCNC: 0.84 MG/DL (ref 0.52–1.04)
GFR SERPL CREATININE-BSD FRML MDRD: 63 ML/MIN/1.7M2
GLUCOSE SERPL-MCNC: 118 MG/DL (ref 70–99)
POTASSIUM SERPL-SCNC: 4.1 MMOL/L (ref 3.4–5.3)
SODIUM SERPL-SCNC: 135 MMOL/L (ref 133–144)

## 2018-01-18 PROCEDURE — 40000914 ZZH STATISTIC SITTER, DAY HOURS

## 2018-01-18 PROCEDURE — 80048 BASIC METABOLIC PNL TOTAL CA: CPT | Performed by: INTERNAL MEDICINE

## 2018-01-18 PROCEDURE — 40000193 ZZH STATISTIC PT WARD VISIT: Performed by: PHYSICAL THERAPY ASSISTANT

## 2018-01-18 PROCEDURE — 25000132 ZZH RX MED GY IP 250 OP 250 PS 637: Performed by: INTERNAL MEDICINE

## 2018-01-18 PROCEDURE — 36415 COLL VENOUS BLD VENIPUNCTURE: CPT | Performed by: INTERNAL MEDICINE

## 2018-01-18 PROCEDURE — 97110 THERAPEUTIC EXERCISES: CPT | Mod: GP | Performed by: PHYSICAL THERAPY ASSISTANT

## 2018-01-18 PROCEDURE — 97116 GAIT TRAINING THERAPY: CPT | Mod: GP | Performed by: PHYSICAL THERAPY ASSISTANT

## 2018-01-18 PROCEDURE — 99239 HOSP IP/OBS DSCHRG MGMT >30: CPT | Performed by: INTERNAL MEDICINE

## 2018-01-18 RX ADMIN — MULTIPLE VITAMINS W/ MINERALS TAB 1 TABLET: TAB at 07:55

## 2018-01-18 ASSESSMENT — ACTIVITIES OF DAILY LIVING (ADL)
ADLS_ACUITY_SCORE: 16

## 2018-01-18 NOTE — PLAN OF CARE
Problem: Patient Care Overview  Goal: Plan of Care/Patient Progress Review  Outcome: Improving  Pt up with one assist, gait belt and walker. One soft BM this shift. Walked x1 in hallway. Good appetite. Continues to be SR on telemetry monitoring. SW consult placed for d/c planning, likely d/c tomorrow or the next day.

## 2018-01-18 NOTE — PLAN OF CARE
Problem: Patient Care Overview  Goal: Plan of Care/Patient Progress Review  Discharge Planner PT   Patient plan for discharge: per son home with other son assist  Current status: gait with CGA to SBA with RW to 125 feet up and down 12 steps with 1 rail and CGA no cues needed( has 8 at home). Basic transfers with S to CGA  Barriers to return to prior living situation: none, will issue RW for home  Recommendations for discharge: PT- Per plan established by the Physical Therapist, according to functional mobility the discharge recommendation is home with son and prior services    Rationale for recommendations: goals are mostly met at this time.       Entered by: Cynthia Campbell 01/18/2018 9:52 AM

## 2018-01-18 NOTE — PLAN OF CARE
Problem: Patient Care Overview  Goal: Plan of Care/Patient Progress Review  Patient is discharging w/ [facility] to [facility]. Discharge instructions provided to son. All questions answered. All belongings w/ patient.

## 2018-01-18 NOTE — PROGRESS NOTES
[facility]  Hospitalist Progress Note  Marco Bolaños MD 01/18/2018    Reason for Stay (Diagnosis): gastroenteritis         Assessment and Plan:      Summary of Stay: Dorothy E Severson is a 89-year-old female with history of hypertension, GERD, sciatica, dementia, osteoarthritis, osteoporosis, cataract surgery, and rotator cuff surgery.  She presented to [place] emergency department for evaluation of weakness, nausea, vomiting, diarrhea, dizziness, and falls. She had felt ill for 6 days. Her son with whom she lives has also been ill for 3 or 4 days.  She had fallen 2 days prior but not on the day of presentation.  She sustained no injury.  Her main complaint seemed to be ongoing diarrhea.  Nausea and vomiting had stopped a day or so before. Vital signs showed elevated blood pressure but they were otherwise stable.  Labs showed hyponatremia with sodium 116, hypokalemia with potassium of 2.7, metabolic acidosis with bicarb of 17, and acute kidney insufficiency with BUN of 52 and creatinine of 1.23.  CBC was unremarkable.  Urinalysis, liver function tests, and influenza testing were negative.  She was having some back pain so lumbar spine x-ray was obtained and showed no acute process.  Pt was admitted with hyponatremia and hyperkalemia in the setting of likely viral gastroenteritis.      Problem list:      1.  Likely viral gastroenteritis.  sx resolved.  advanced diet to regular.  cdiff neg      2.  Hyponatremia.  resolved.  Likely due to GI sodium losses from vomiting and diarrhea with free water replacement.  I suspect that this is acute.        3.  Hypokalemia.  Resolved.  Likely due to GI losses with free water replacement.  Replaced per protocol.      4.  Physical deconditioning.  Treat underlying issues discussed above.  Physical therapy consulted.     5.  dementia      DNR/DNI.   Mechanical DVT prophylaxis  Disposition: medically stable for discharge - d/c when safe discharge plan in  "place.  Pt was living with one of her sons PTA, but unable to get a hold of him yet        Interval History (Subjective):      Feels well.  No complaints.  Diarrhea resolved.  eating                  Physical Exam:      Last Vital Signs:  /53 (BP Location: Left arm)  Pulse 64  Temp 97.1  F (36.2  C) (Oral)  Resp 18  Ht 1.499 m (4' 11.02\")  Wt 36.3 kg (80 lb)  SpO2 99%  BMI 16.15 kg/m2      Intake/Output Summary (Last 24 hours) at 01/18/18 1318  Last data filed at 01/18/18 1115   Gross per 24 hour   Intake             1740 ml   Output                0 ml   Net             1740 ml       Constitutional: Awake, alert, cooperative, no apparent distress.  Oldest son present   Respiratory: Clear to auscultation bilaterally, no crackles or wheezing   Cardiovascular: Regular rate and rhythm, normal S1 and S2, and no murmur noted   Abdomen: Normal bowel sounds, soft, non-distended, non-tender   Skin: No rashes, no cyanosis, dry to touch   Neuro: Alert and oriented x3, no weakness, numbness, + memory loss   Extremities: No edema, normal range of motion   Other(s):        All other systems: Negative          Medications:      All current medications were reviewed with changes reflected in problem list.         Data:      All new lab and imaging data was reviewed.   Labs:  No results for input(s): WBC, HGB, HCT, MCV, PLT in the last 168 hours.   Imaging:   No results found for this or any previous visit (from the past 24 hour(s)).   "

## 2018-01-18 NOTE — CONSULTS
SWS  Spoke to [facility] at [facility] who said the call last night was a misunderstanding and they were not taking any action.    Spoke to Chilango (son) via phone.  He stated he will not be prepared to have pt at home until tomorrow at least.  Met with son Feliberto in Room.  He is able to have pt spend the weekend with him and will work with Chilango to get pt home ultimately.  He does not want home care.    P:  No further SW needs

## 2018-01-18 NOTE — PLAN OF CARE
Problem: Patient Care Overview  Goal: Plan of Care/Patient Progress Review  Orientation: A/O to self only, does not call or make needs known well. Very restless sleeping on and off this shift. Sitter present at bedside giving continuous cues  VS stable, afebrile, denies pain   Tele: SR in 70s   LS: clear and equal  GI: + Flatus - BM. Denies N/V. BS active  : Adequate urine output.   Diet: Regular  PIV: NS with 20mEq K+ at 50ml/hr until 0700 per order to discontinue.  Activity: assist of 1 with gait belt for mobility.   Disposition: Expected discharge TBD pending discharge location, SW involved with family for placement.

## 2018-01-20 NOTE — DISCHARGE SUMMARY
PRINCIPAL FINAL DIAGNOSES:   1.  Gastroenteritis, acute, suspect viral in origin.     2.  Clostridium difficile toxin, negative this admission.   3.  Hyponatremia secondary to above, resolved.   4.  Hypokalemia secondary to above, resolved.      PAST MEDICAL HISTORY:   1.  Dementia.   2.  Hypertension.   3.  Reflux disease.   4.  Sciatica.   5.  Osteoarthritis.   6.  Osteoporosis.   7.  Cataract surgery.   8.  DNR/DNI code status.      PRINCIPAL PROCEDURES THIS ADMISSION:   1.  IV fluid hydration.   2.  Clostridium difficile toxin that was negative.      REASON FOR ADMISSION:  Please see dictated history and physical by Dr. Good.  In brief, Ms. Severson is an 89-year-old female with a history of dementia who presented to the hospital with diarrhea, dehydration, and electrolyte abnormalities.  Please see dictated history and physical for details.      HOSPITAL COURSE:  Acute viral gastroenteritis:  Patient was given IV fluids.  Symptoms gradually resolved while hospitalized.  By day of discharge, she was able to tolerate a normal diet.  Diarrhea had resolved.  She appears stable for discharge from the hospital on 2018.  She did not appear septic or toxic this admission.      Patient was discharged to home with family.      DISCHARGE MEDICATIONS:  No prescription medications.      DISCHARGE INSTRUCTIONS:  See primary MD as needed.      Patient was seen and examined on day of discharge.        I spent greater than 30 minutes discharging the patient from the hospital on day of discharge.         YASMEEN FONTAINE MD             D: 2018 18:12   T: 2018 18:41   MT: NTS      Name:     SEVERSON, DOROTHY   MRN:      [id number]        Account:        [id number]   :      [id number]           Admit Date:                                       Discharge Date: 2018      Document: [id number]       cc: El Tse MD